# Patient Record
Sex: FEMALE | Race: WHITE | NOT HISPANIC OR LATINO | Employment: OTHER | RURAL
[De-identification: names, ages, dates, MRNs, and addresses within clinical notes are randomized per-mention and may not be internally consistent; named-entity substitution may affect disease eponyms.]

---

## 2020-09-16 ENCOUNTER — HISTORICAL (OUTPATIENT)
Dept: ADMINISTRATIVE | Facility: HOSPITAL | Age: 75
End: 2020-09-16

## 2021-01-29 ENCOUNTER — HISTORICAL (OUTPATIENT)
Dept: ADMINISTRATIVE | Facility: HOSPITAL | Age: 76
End: 2021-01-29

## 2022-08-24 ENCOUNTER — OFFICE VISIT (OUTPATIENT)
Dept: GASTROENTEROLOGY | Facility: CLINIC | Age: 77
End: 2022-08-24
Payer: MEDICARE

## 2022-08-24 VITALS
BODY MASS INDEX: 21.64 KG/M2 | HEART RATE: 60 BPM | OXYGEN SATURATION: 97 % | WEIGHT: 114.63 LBS | SYSTOLIC BLOOD PRESSURE: 164 MMHG | HEIGHT: 61 IN | DIASTOLIC BLOOD PRESSURE: 50 MMHG

## 2022-08-24 DIAGNOSIS — R68.81 EARLY SATIETY: ICD-10-CM

## 2022-08-24 DIAGNOSIS — R14.0 BLOATING: Primary | ICD-10-CM

## 2022-08-24 DIAGNOSIS — K59.00 CONSTIPATION, UNSPECIFIED CONSTIPATION TYPE: ICD-10-CM

## 2022-08-24 PROCEDURE — 99214 PR OFFICE/OUTPT VISIT, EST, LEVL IV, 30-39 MIN: ICD-10-PCS | Mod: ,,, | Performed by: NURSE PRACTITIONER

## 2022-08-24 PROCEDURE — 1101F PT FALLS ASSESS-DOCD LE1/YR: CPT | Mod: CPTII,,, | Performed by: NURSE PRACTITIONER

## 2022-08-24 PROCEDURE — 3288F FALL RISK ASSESSMENT DOCD: CPT | Mod: CPTII,,, | Performed by: NURSE PRACTITIONER

## 2022-08-24 PROCEDURE — 99214 OFFICE O/P EST MOD 30 MIN: CPT | Mod: ,,, | Performed by: NURSE PRACTITIONER

## 2022-08-24 PROCEDURE — 3288F PR FALLS RISK ASSESSMENT DOCUMENTED: ICD-10-PCS | Mod: CPTII,,, | Performed by: NURSE PRACTITIONER

## 2022-08-24 PROCEDURE — 1160F PR REVIEW ALL MEDS BY PRESCRIBER/CLIN PHARMACIST DOCUMENTED: ICD-10-PCS | Mod: CPTII,,, | Performed by: NURSE PRACTITIONER

## 2022-08-24 PROCEDURE — 1159F PR MEDICATION LIST DOCUMENTED IN MEDICAL RECORD: ICD-10-PCS | Mod: CPTII,,, | Performed by: NURSE PRACTITIONER

## 2022-08-24 PROCEDURE — 1159F MED LIST DOCD IN RCRD: CPT | Mod: CPTII,,, | Performed by: NURSE PRACTITIONER

## 2022-08-24 PROCEDURE — 3077F PR MOST RECENT SYSTOLIC BLOOD PRESSURE >= 140 MM HG: ICD-10-PCS | Mod: CPTII,,, | Performed by: NURSE PRACTITIONER

## 2022-08-24 PROCEDURE — 3078F DIAST BP <80 MM HG: CPT | Mod: CPTII,,, | Performed by: NURSE PRACTITIONER

## 2022-08-24 PROCEDURE — 3077F SYST BP >= 140 MM HG: CPT | Mod: CPTII,,, | Performed by: NURSE PRACTITIONER

## 2022-08-24 PROCEDURE — 1160F RVW MEDS BY RX/DR IN RCRD: CPT | Mod: CPTII,,, | Performed by: NURSE PRACTITIONER

## 2022-08-24 PROCEDURE — 1101F PR PT FALLS ASSESS DOC 0-1 FALLS W/OUT INJ PAST YR: ICD-10-PCS | Mod: CPTII,,, | Performed by: NURSE PRACTITIONER

## 2022-08-24 PROCEDURE — 3078F PR MOST RECENT DIASTOLIC BLOOD PRESSURE < 80 MM HG: ICD-10-PCS | Mod: CPTII,,, | Performed by: NURSE PRACTITIONER

## 2022-08-24 RX ORDER — POLYETHYLENE GLYCOL 3350 17 G/17G
17 POWDER, FOR SOLUTION ORAL 2 TIMES DAILY
COMMUNITY

## 2022-08-24 RX ORDER — FERROUS SULFATE, DRIED 160(50) MG
1 TABLET, EXTENDED RELEASE ORAL
COMMUNITY

## 2022-08-24 RX ORDER — DENOSUMAB 60 MG/ML
INJECTION SUBCUTANEOUS
COMMUNITY

## 2022-08-24 RX ORDER — ACYCLOVIR 800 MG/1
TABLET ORAL
COMMUNITY

## 2022-08-24 RX ORDER — DILTIAZEM HYDROCHLORIDE 240 MG/1
240 CAPSULE, COATED, EXTENDED RELEASE ORAL DAILY
COMMUNITY

## 2022-08-24 RX ORDER — LORAZEPAM 1 MG/1
1 TABLET ORAL
COMMUNITY

## 2022-08-24 NOTE — PROGRESS NOTES
Kim Aden is a 76 y.o. female here for No chief complaint on file.        PCP: Andre Dixon  Referring Provider: No referring provider defined for this encounter.     HPI:  Presents with report of bloating and abdominal discomfort. Reports that she does have decreased appetite. States that she is never hungry. Endorses early satiety. Does have epigastric discomfort that is intermittent. Is not taking PPI due to severe osteoporosis. States that bloating is worse at night. Chronic constipation. Is taking Miralax powder twice daily. Reports that since doing this bowel movements have improved and increased in quantity. Last colonoscopy 9/28/20, recommendation to repeat in 5 years. History of colon cancer in the patient's brother. Unable to tolerate increased fiber in her diet due to gas and bloating. Has not had an EGD in over 10 years.        ROS:  Review of Systems   Constitutional: Positive for appetite change (early satiety, decreased appetite). Negative for fatigue, fever and unexpected weight change.   HENT: Negative for trouble swallowing.    Cardiovascular: Negative for chest pain.   Gastrointestinal: Positive for abdominal distention, abdominal pain (epigastric pain) and constipation. Negative for blood in stool, change in bowel habit, diarrhea, nausea, vomiting, reflux and change in bowel habit.   Musculoskeletal: Positive for arthralgias. Negative for gait problem and joint swelling.   Integumentary:  Negative for pallor.   Neurological: Negative for light-headedness.   Psychiatric/Behavioral: The patient is not nervous/anxious.           PMHX:  has a past medical history of Digestive disorder and Hypertension.    PSHX:  has a past surgical history that includes Cholecystectomy; Tonsillectomy; and left shoulder.    PFHX: family history includes Diabetes in her brother; Hypertension in her brother.    PSlHX:  reports that she has never smoked. She has never used smokeless tobacco. She reports  "current alcohol use. She reports that she does not use drugs.        Review of patient's allergies indicates:   Allergen Reactions    Ciprofloxacin Rash    Penicillins Rash       Medication List with Changes/Refills   Current Medications    ACYCLOVIR (ZOVIRAX) 800 MG TAB    acyclovir 800 mg tablet   Take 1 tablet 3 times a day by oral route as needed.    CALCIUM-VITAMIN D3 (OS-SHAQUILLE 500 + D3) 500 MG-5 MCG (200 UNIT) PER TABLET    Take 1 tablet by mouth.    DENOSUMAB (PROLIA) 60 MG/ML SYRG    Prolia 60 mg/mL subcutaneous syringe    DILTIAZEM (CARDIZEM CD) 240 MG 24 HR CAPSULE    Take 240 mg by mouth once daily.    LACTOBACILLUS ACIDOPHILUS ORAL    Take 1 capsule by mouth once daily.    LORAZEPAM (ATIVAN) 1 MG TABLET    Take 1 mg by mouth before meals, at bedtime and at 0200.    MULTIVITAMIN CAPSULE    Take 1 capsule by mouth.    POLYETHYLENE GLYCOL (GLYCOLAX) 17 GRAM/DOSE POWDER    Take 17 g by mouth 2 (two) times a day.    RANITIDINE (ZANTAC) 150 MG TABLET    Take 150 mg by mouth as needed.    VIT B COMPLEX 100 NO.2/HERBS (VITAMIN B COMPLEX 100  2-HERBS ORAL)    vitamin B complex 100  2-herbs   Once Daily        Objective Findings:  Vital Signs:  BP (!) 164/50   Pulse 60   Ht 5' 1" (1.549 m)   Wt 52 kg (114 lb 9.6 oz)   SpO2 97%   BMI 21.65 kg/m²  Body mass index is 21.65 kg/m².    Physical Exam:  Physical Exam  Vitals and nursing note reviewed.   Constitutional:       General: She is not in acute distress.     Appearance: Normal appearance.   HENT:      Mouth/Throat:      Mouth: Mucous membranes are moist.   Cardiovascular:      Rate and Rhythm: Normal rate.   Pulmonary:      Breath sounds: No wheezing, rhonchi or rales.   Abdominal:      General: Bowel sounds are normal. There is no distension.      Palpations: Abdomen is soft. There is no mass.      Tenderness: There is no abdominal tenderness. There is no guarding or rebound.      Hernia: No hernia is present.   Musculoskeletal:      Right lower leg: No " edema.      Left lower leg: No edema.   Skin:     General: Skin is warm and dry.      Coloration: Skin is not jaundiced or pale.   Neurological:      Mental Status: She is alert and oriented to person, place, and time.   Psychiatric:         Mood and Affect: Mood normal.          Labs:  No results found for: WBC, HGB, HCT, MCV, RDW, PLT, GRAN, LYMPH, MONO, EOS, BASO  No results found for: NA, K, CL, CO2, GLU, BUN, CREATININE, CALCIUM, PROT, ALBUMIN, BILITOT, ALKPHOS, AST, ALT      Imaging: No results found.      Assessment:  Kim Aden is a 76 y.o. female here with:  1. Bloating    2. Early satiety    3. Constipation, unspecified constipation type          Recommendations:  1. EGD  2. GES  3. Do not lay down within 3 hours of eating.  Avoid spicy, greasy foods  Eat 6-8 small meals per day  Avoid raw fruits and vegetables  Small amount of protein and fiber at one time  4. Pepcid 20 mg daily  5. Increase water to 64 ounces daily      Follow up in about 6 weeks (around 10/5/2022).      Order summary:  Orders Placed This Encounter    NM Gastric Emptying    EGD       Thank you for allowing me to participate in the care of Kim Aden.      OVIDIO Lezama

## 2022-08-30 ENCOUNTER — TELEPHONE (OUTPATIENT)
Dept: GASTROENTEROLOGY | Facility: CLINIC | Age: 77
End: 2022-08-30
Payer: MEDICARE

## 2022-08-30 NOTE — TELEPHONE ENCOUNTER
Returned call to patient. Patient states she is okay with having the EGD, but she was concerned about having the gastric emptying study. Patient states was worried about having to sit there in between scans and asking how long it would take. Instructed patient on the procedure. Patient asking for specific time frames. Instructed patient to call the imaging center for those specific questions. Patient verbalized understanding.            ----- Message from Yue Bear sent at 8/30/2022 11:12 AM CDT -----  Questions she has about upcoming imaging tests asked for darcy

## 2022-09-19 ENCOUNTER — HOSPITAL ENCOUNTER (OUTPATIENT)
Dept: RADIOLOGY | Facility: HOSPITAL | Age: 77
Discharge: HOME | End: 2022-09-19
Attending: NURSE PRACTITIONER
Payer: MEDICARE

## 2022-09-19 DIAGNOSIS — R68.81 EARLY SATIETY: ICD-10-CM

## 2022-09-19 DIAGNOSIS — R14.0 BLOATING: ICD-10-CM

## 2022-09-19 PROCEDURE — A9541 TC99M SULFUR COLLOID: HCPCS

## 2022-09-19 PROCEDURE — 78264 NM GASTRIC EMPTYING: ICD-10-PCS | Mod: 26,,, | Performed by: RADIOLOGY

## 2022-09-19 PROCEDURE — 78264 GASTRIC EMPTYING IMG STUDY: CPT | Mod: 26,,, | Performed by: RADIOLOGY

## 2022-09-20 ENCOUNTER — ANESTHESIA (OUTPATIENT)
Dept: GASTROENTEROLOGY | Facility: HOSPITAL | Age: 77
End: 2022-09-20
Payer: MEDICARE

## 2022-09-20 ENCOUNTER — ANESTHESIA EVENT (OUTPATIENT)
Dept: GASTROENTEROLOGY | Facility: HOSPITAL | Age: 77
End: 2022-09-20
Payer: MEDICARE

## 2022-09-20 ENCOUNTER — HOSPITAL ENCOUNTER (OUTPATIENT)
Dept: GASTROENTEROLOGY | Facility: HOSPITAL | Age: 77
Discharge: HOME | End: 2022-09-20
Attending: NURSE PRACTITIONER
Payer: MEDICARE

## 2022-09-20 VITALS
HEART RATE: 54 BPM | RESPIRATION RATE: 15 BRPM | DIASTOLIC BLOOD PRESSURE: 46 MMHG | OXYGEN SATURATION: 99 % | TEMPERATURE: 98 F | SYSTOLIC BLOOD PRESSURE: 106 MMHG

## 2022-09-20 DIAGNOSIS — K29.00 ACUTE SUPERFICIAL GASTRITIS WITHOUT HEMORRHAGE: ICD-10-CM

## 2022-09-20 DIAGNOSIS — R68.81 EARLY SATIETY: ICD-10-CM

## 2022-09-20 DIAGNOSIS — R10.9 ABDOMINAL PAIN, UNSPECIFIED ABDOMINAL LOCATION: Primary | ICD-10-CM

## 2022-09-20 DIAGNOSIS — R14.0 BLOATING: ICD-10-CM

## 2022-09-20 DIAGNOSIS — K44.9 HH (HIATUS HERNIA): ICD-10-CM

## 2022-09-20 PROCEDURE — 25000003 PHARM REV CODE 250: Performed by: NURSE ANESTHETIST, CERTIFIED REGISTERED

## 2022-09-20 PROCEDURE — 43239 EGD BIOPSY SINGLE/MULTIPLE: CPT

## 2022-09-20 PROCEDURE — 63600175 PHARM REV CODE 636 W HCPCS: Performed by: NURSE ANESTHETIST, CERTIFIED REGISTERED

## 2022-09-20 PROCEDURE — 37000008 HC ANESTHESIA 1ST 15 MINUTES

## 2022-09-20 PROCEDURE — D9220A PRA ANESTHESIA: Mod: ,,, | Performed by: NURSE ANESTHETIST, CERTIFIED REGISTERED

## 2022-09-20 PROCEDURE — D9220A PRA ANESTHESIA: ICD-10-PCS | Mod: ,,, | Performed by: NURSE ANESTHETIST, CERTIFIED REGISTERED

## 2022-09-20 PROCEDURE — 27201423 OPTIME MED/SURG SUP & DEVICES STERILE SUPPLY

## 2022-09-20 RX ORDER — FAMOTIDINE 40 MG/1
40 TABLET, FILM COATED ORAL DAILY
Qty: 30 TABLET | Refills: 11 | Status: SHIPPED | OUTPATIENT
Start: 2022-09-20 | End: 2023-09-20

## 2022-09-20 RX ORDER — SODIUM CHLORIDE 0.9 % (FLUSH) 0.9 %
10 SYRINGE (ML) INJECTION
Status: CANCELLED | OUTPATIENT
Start: 2022-09-20

## 2022-09-20 RX ORDER — PROPOFOL 10 MG/ML
VIAL (ML) INTRAVENOUS
Status: DISCONTINUED | OUTPATIENT
Start: 2022-09-20 | End: 2022-09-20

## 2022-09-20 RX ORDER — LIDOCAINE HYDROCHLORIDE 20 MG/ML
INJECTION, SOLUTION EPIDURAL; INFILTRATION; INTRACAUDAL; PERINEURAL
Status: DISCONTINUED | OUTPATIENT
Start: 2022-09-20 | End: 2022-09-20

## 2022-09-20 RX ADMIN — PROPOFOL 30 MG: 10 INJECTION, EMULSION INTRAVENOUS at 10:09

## 2022-09-20 RX ADMIN — LIDOCAINE HYDROCHLORIDE 60 MG: 20 INJECTION, SOLUTION INTRAVENOUS at 10:09

## 2022-09-20 RX ADMIN — SODIUM CHLORIDE: 9 INJECTION, SOLUTION INTRAVENOUS at 10:09

## 2022-09-20 RX ADMIN — PROPOFOL 20 MG: 10 INJECTION, EMULSION INTRAVENOUS at 10:09

## 2022-09-20 RX ADMIN — PROPOFOL 60 MG: 10 INJECTION, EMULSION INTRAVENOUS at 10:09

## 2022-09-20 NOTE — DISCHARGE INSTRUCTIONS
Procedure Date  9/20/22     Impression  Overall Impression: Mucosa of the esophagus is normal with z-line at 37cm, overlying a 3cm hiatus hernia. The stomach has gastric erythema without ulcers, biopsies were obtained. The pyloric channel is patent. Mucosa of the duodenum is normal.     Recommendation    Await pathology results      Avoid nsaids; Pepcid 40mg q AM to Bid as needed. Offer patient a small bowel x-ray series for mid abdominal pain, then return to GI clinic.       DO NOT DRIVE FOR 24 HOURS OR OPERATE HEAVY MACHINERY.   DO NOT SIGN LEGAL DOCUMENTS.  DRINK PLENTY OF FLUIDS. RESUME DIET.

## 2022-09-20 NOTE — H&P
[facility] - Endoscopy  Gastroenterology  H&P    Patient Name: Kim Aden  MRN: [id number]  Admission Date: 9/20/2022  Code Status: No Order    Attending Provider: VI Flores   Primary Care Physician: Andre Dixon MD  Principal Problem:<principal problem not specified>    Subjective:     History of Present Illness: Pt c/o abdominal bloating; for egd.    Past Medical History:   Diagnosis Date    Digestive disorder     Hypertension        Past Surgical History:   Procedure Laterality Date    CHOLECYSTECTOMY      left shoulder      TONSILLECTOMY         Review of patient's allergies indicates:   Allergen Reactions    Ciprofloxacin Rash    Penicillins Rash     Family History       Problem Relation (Age of Onset)    Diabetes Brother    Hypertension Brother          Tobacco Use    Smoking status: Never    Smokeless tobacco: Never   Substance and Sexual Activity    Alcohol use: Yes     Comment: twice weekly    Drug use: Never    Sexual activity: Not on file     Review of Systems   Respiratory: Negative.     Cardiovascular: Negative.    Gastrointestinal:  Positive for abdominal distention and constipation.   Objective:     Vital Signs (Most Recent):  Temp: 97.9 °F (36.6 °C) (09/20/22 1012)  Pulse: (!) 53 (09/20/22 1012)  Resp: 14 (09/20/22 1012)  BP: (!) 159/79 (09/20/22 1012)  SpO2: 98 % (09/20/22 1012)   Vital Signs (24h Range):  Temp:  [97.9 °F (36.6 °C)] 97.9 °F (36.6 °C)  Pulse:  [53] 53  Resp:  [14] 14  SpO2:  [98 %] 98 %  BP: (159)/(79) 159/79        There is no height or weight on file to calculate BMI.    No intake or output data in the 24 hours ending 09/20/22 1022    Lines/Drains/Airways       Peripheral Intravenous Line  Duration                  Peripheral IV - Single Lumen 09/20/22 1013 22 G Anterior;Left Hand <1 day                    Physical Exam  Vitals reviewed.   Constitutional:       General: She is not in acute distress.     Appearance: Normal appearance. She is well-developed. She is  not ill-appearing.   HENT:      Head: Normocephalic and atraumatic.      Nose: Nose normal.   Eyes:      Pupils: Pupils are equal, round, and reactive to light.   Cardiovascular:      Rate and Rhythm: Normal rate and regular rhythm.   Pulmonary:      Effort: Pulmonary effort is normal.      Breath sounds: Normal breath sounds. No wheezing.   Abdominal:      General: Abdomen is flat. Bowel sounds are normal. There is no distension.      Palpations: Abdomen is soft.      Tenderness: There is no abdominal tenderness. There is no guarding.   Skin:     General: Skin is warm and dry.      Coloration: Skin is not jaundiced.   Neurological:      Mental Status: She is alert.   Psychiatric:         Attention and Perception: Attention normal.         Mood and Affect: Affect normal.         Speech: Speech normal.         Behavior: Behavior is cooperative.      Comments: Pt was calm while speaking.       Significant Labs:  CBC: No results for input(s): WBC, HGB, HCT, PLT in the last 48 hours.  CMP: No results for input(s): GLU, CALCIUM, ALBUMIN, PROT, NA, K, CO2, CL, BUN, CREATININE, ALKPHOS, ALT, AST, BILITOT in the last 48 hours.    Significant Imaging:  Imaging results within the past 24 hours have been reviewed.    Assessment/Plan:     There are no hospital problems to display for this patient.        Imp: abdominal bloating and mid abdominal pain  Plan: egd    Chuy Skaggs MD  Gastroenterology  [facility] - Endoscopy

## 2022-09-20 NOTE — ANESTHESIA POSTPROCEDURE EVALUATION
Anesthesia Post Evaluation    Patient: Kim Mccarthy Post    Procedure(s) Performed: EGD with Biopsy    Final Anesthesia Type: general      Patient location during evaluation: GI PACU  Patient participation: Yes- Able to Participate  Level of consciousness: awake and alert  Post-procedure vital signs: reviewed and stable  Pain management: adequate  Airway patency: patent  SARA mitigation strategies: Multimodal analgesia  PONV status at discharge: No PONV  Anesthetic complications: no      Cardiovascular status: stable  Respiratory status: unassisted  Hydration status: euvolemic  Follow-up not needed.          Vitals Value Taken Time   /79 09/20/22 1054   Temp 36.5 °C (97.7 °F) 09/20/22 1028   Pulse 51 09/20/22 1054   Resp 13 09/20/22 1054   SpO2 99 % 09/20/22 1054   Vitals shown include unvalidated device data.      No case tracking events are documented in the log.      Pain/Mony Score: No data recorded

## 2022-09-20 NOTE — TRANSFER OF CARE
Anesthesia Transfer of Care Note    Patient: Kim Mccarthy Post    Procedure(s) Performed: EGD with biopsy    Patient location: GI    Anesthesia Type: general    Transport from OR: Transported from OR on room air with adequate spontaneous ventilation    Post pain: adequate analgesia    Post assessment: no apparent anesthetic complications and tolerated procedure well    Post vital signs: stable    Level of consciousness: responds to stimulation and sedated    Nausea/Vomiting: no nausea/vomiting    Complications: none    Transfer of care protocol was followed      Last vitals:   Visit Vitals  BP (!) 125/40 (BP Location: Right arm, Patient Position: Lying)   Pulse 60   Temp 36.5 °C (97.7 °F) (Skin)   Resp 15   SpO2 98%   Breastfeeding No

## 2022-09-20 NOTE — ANESTHESIA PREPROCEDURE EVALUATION
09/20/2022  Kim Aden is a 76 y.o., female.    Past Medical History:   Diagnosis Date    Digestive disorder     Hypertension        Past Surgical History:   Procedure Laterality Date    CHOLECYSTECTOMY      left shoulder      TONSILLECTOMY         Family History   Problem Relation Age of Onset    Diabetes Brother     Hypertension Brother        Social History     Socioeconomic History    Marital status:    Tobacco Use    Smoking status: Never    Smokeless tobacco: Never   Substance and Sexual Activity    Alcohol use: Yes     Comment: twice weekly    Drug use: Never       Current Outpatient Medications   Medication Sig Dispense Refill    acyclovir (ZOVIRAX) 800 MG Tab acyclovir 800 mg tablet   Take 1 tablet 3 times a day by oral route as needed.      calcium-vitamin D3 (OS-SHAQUILLE 500 + D3) 500 mg-5 mcg (200 unit) per tablet Take 1 tablet by mouth.      denosumab (PROLIA) 60 mg/mL Syrg Prolia 60 mg/mL subcutaneous syringe      diltiaZEM (CARDIZEM CD) 240 MG 24 hr capsule Take 240 mg by mouth once daily.      LACTOBACILLUS ACIDOPHILUS ORAL Take 1 capsule by mouth once daily.      LORazepam (ATIVAN) 1 MG tablet Take 1 mg by mouth before meals, at bedtime and at 0200.      multivitamin capsule Take 1 capsule by mouth.      polyethylene glycol (GLYCOLAX) 17 gram/dose powder Take 17 g by mouth 2 (two) times a day.      ranitidine (ZANTAC) 150 MG tablet Take 150 mg by mouth as needed.      vit B complex 100 no.2/herbs (VITAMIN B COMPLEX 100  2-HERBS ORAL) vitamin B complex 100  2-herbs   Once Daily       No current facility-administered medications for this encounter.       Review of patient's allergies indicates:   Allergen Reactions    Ciprofloxacin Rash    Penicillins Rash       Pre-op Assessment    I have reviewed the Patient Summary Reports.    I have reviewed the NPO Status.    I have reviewed the Medications.     Review of Systems  Anesthesia Hx:  No problems with previous Anesthesia    Cardiovascular:   Hypertension        Physical Exam    Airway:  Mallampati: II   Mouth Opening: Normal  TM Distance: Normal  Tongue: Normal        Anesthesia Plan  Type of Anesthesia, risks & benefits discussed:    Anesthesia Type: Gen Natural Airway  Intra-op Monitoring Plan: Standard ASA Monitors  Post Op Pain Control Plan: multimodal analgesia  Induction:  IV  Informed Consent: Informed consent signed with the Patient and all parties understand the risks and agree with anesthesia plan.  All questions answered. Patient consented to blood products? Yes  ASA Score: 2  Day of Surgery Review of History & Physical: H&P Update referred to the surgeon/provider.    Ready For Surgery From Anesthesia Perspective.     .

## 2022-09-21 LAB
ESTROGEN SERPL-MCNC: NORMAL PG/ML
INSULIN SERPL-ACNC: NORMAL U[IU]/ML
LAB AP GROSS DESCRIPTION: NORMAL
LAB AP LABORATORY NOTES: NORMAL
T3RU NFR SERPL: NORMAL %

## 2022-09-22 ENCOUNTER — TELEPHONE (OUTPATIENT)
Dept: GASTROENTEROLOGY | Facility: CLINIC | Age: 77
End: 2022-09-22
Payer: MEDICARE

## 2022-09-22 NOTE — TELEPHONE ENCOUNTER
Your EGD bx shows gastrits(inflammation in the stomach lining) and does not have a bacterial infection. Dr. Skaggs recommends you to continue your PPI and if you have any questions please call our office.    ----- Message from Chuy Skaggs MD sent at 9/21/2022  4:37 PM CDT -----  EGD bx shows gastritis without H.pylori.  ----- Message -----  From: Background User Lab  Sent: 9/21/2022   2:08 PM CDT  To: Chuy Skaggs MD

## 2022-09-26 ENCOUNTER — HOSPITAL ENCOUNTER (OUTPATIENT)
Dept: RADIOLOGY | Facility: HOSPITAL | Age: 77
Discharge: HOME | End: 2022-09-26
Attending: INTERNAL MEDICINE
Payer: MEDICARE

## 2022-09-26 DIAGNOSIS — R10.9 ABDOMINAL PAIN, UNSPECIFIED ABDOMINAL LOCATION: ICD-10-CM

## 2022-09-26 PROCEDURE — 74250 X-RAY XM SM INT 1CNTRST STD: CPT | Mod: TC

## 2022-09-26 PROCEDURE — 74250 FL SMALL BOWEL FOLLOW THROUGH: ICD-10-PCS | Mod: 26,,, | Performed by: RADIOLOGY

## 2022-09-26 PROCEDURE — 74250 X-RAY XM SM INT 1CNTRST STD: CPT | Mod: 26,,, | Performed by: RADIOLOGY

## 2022-09-27 ENCOUNTER — TELEPHONE (OUTPATIENT)
Dept: GASTROENTEROLOGY | Facility: CLINIC | Age: 77
End: 2022-09-27
Payer: MEDICARE

## 2022-09-27 NOTE — TELEPHONE ENCOUNTER
Called patient to discuss results and verbalized understanding.      ----- Message from Chuy Skaggs MD sent at 9/26/2022  3:15 PM CDT -----  Small bowel x-ray series shows normal appearing small intestine, increased stool in the colon and osteopenia.  ----- Message -----  From: Kamla, Rad Results In  Sent: 9/26/2022  12:20 PM CDT  To: Chuy Skaggs MD

## 2022-10-04 PROCEDURE — 82653 MAYO GENERIC ORDERABLE: ICD-10-PCS | Mod: 90,,, | Performed by: CLINICAL MEDICAL LABORATORY

## 2022-10-04 PROCEDURE — 82653 EL-1 FECAL QUANTITATIVE: CPT | Mod: 90,,, | Performed by: CLINICAL MEDICAL LABORATORY

## 2022-10-05 ENCOUNTER — OFFICE VISIT (OUTPATIENT)
Dept: GASTROENTEROLOGY | Facility: CLINIC | Age: 77
End: 2022-10-05
Payer: MEDICARE

## 2022-10-05 ENCOUNTER — TELEPHONE (OUTPATIENT)
Dept: GASTROENTEROLOGY | Facility: CLINIC | Age: 77
End: 2022-10-05
Payer: MEDICARE

## 2022-10-05 ENCOUNTER — HOSPITAL ENCOUNTER (OUTPATIENT)
Dept: RADIOLOGY | Facility: HOSPITAL | Age: 77
Discharge: HOME | End: 2022-10-05
Attending: NURSE PRACTITIONER
Payer: MEDICARE

## 2022-10-05 VITALS
DIASTOLIC BLOOD PRESSURE: 67 MMHG | HEIGHT: 61 IN | WEIGHT: 111.81 LBS | HEART RATE: 55 BPM | OXYGEN SATURATION: 98 % | SYSTOLIC BLOOD PRESSURE: 176 MMHG | BODY MASS INDEX: 21.11 KG/M2

## 2022-10-05 DIAGNOSIS — R10.9 ABDOMINAL PAIN, UNSPECIFIED ABDOMINAL LOCATION: ICD-10-CM

## 2022-10-05 DIAGNOSIS — K44.9 HH (HIATUS HERNIA): ICD-10-CM

## 2022-10-05 DIAGNOSIS — R14.0 BLOATING: ICD-10-CM

## 2022-10-05 DIAGNOSIS — R68.81 EARLY SATIETY: ICD-10-CM

## 2022-10-05 DIAGNOSIS — R10.9 ABDOMINAL PAIN, UNSPECIFIED ABDOMINAL LOCATION: Primary | ICD-10-CM

## 2022-10-05 DIAGNOSIS — K59.00 CONSTIPATION, UNSPECIFIED CONSTIPATION TYPE: ICD-10-CM

## 2022-10-05 DIAGNOSIS — K29.00 ACUTE SUPERFICIAL GASTRITIS WITHOUT HEMORRHAGE: ICD-10-CM

## 2022-10-05 PROCEDURE — 1160F PR REVIEW ALL MEDS BY PRESCRIBER/CLIN PHARMACIST DOCUMENTED: ICD-10-PCS | Mod: CPTII,,, | Performed by: NURSE PRACTITIONER

## 2022-10-05 PROCEDURE — 3077F PR MOST RECENT SYSTOLIC BLOOD PRESSURE >= 140 MM HG: ICD-10-PCS | Mod: CPTII,,, | Performed by: NURSE PRACTITIONER

## 2022-10-05 PROCEDURE — 1159F MED LIST DOCD IN RCRD: CPT | Mod: CPTII,,, | Performed by: NURSE PRACTITIONER

## 2022-10-05 PROCEDURE — 3078F PR MOST RECENT DIASTOLIC BLOOD PRESSURE < 80 MM HG: ICD-10-PCS | Mod: CPTII,,, | Performed by: NURSE PRACTITIONER

## 2022-10-05 PROCEDURE — 1159F PR MEDICATION LIST DOCUMENTED IN MEDICAL RECORD: ICD-10-PCS | Mod: CPTII,,, | Performed by: NURSE PRACTITIONER

## 2022-10-05 PROCEDURE — 99214 PR OFFICE/OUTPT VISIT, EST, LEVL IV, 30-39 MIN: ICD-10-PCS | Mod: ,,, | Performed by: NURSE PRACTITIONER

## 2022-10-05 PROCEDURE — 74018 RADEX ABDOMEN 1 VIEW: CPT | Mod: TC

## 2022-10-05 PROCEDURE — 3078F DIAST BP <80 MM HG: CPT | Mod: CPTII,,, | Performed by: NURSE PRACTITIONER

## 2022-10-05 PROCEDURE — 99214 OFFICE O/P EST MOD 30 MIN: CPT | Mod: ,,, | Performed by: NURSE PRACTITIONER

## 2022-10-05 PROCEDURE — 74018 XR KUB: ICD-10-PCS | Mod: 26,,, | Performed by: RADIOLOGY

## 2022-10-05 PROCEDURE — 3288F PR FALLS RISK ASSESSMENT DOCUMENTED: ICD-10-PCS | Mod: CPTII,,, | Performed by: NURSE PRACTITIONER

## 2022-10-05 PROCEDURE — 1160F RVW MEDS BY RX/DR IN RCRD: CPT | Mod: CPTII,,, | Performed by: NURSE PRACTITIONER

## 2022-10-05 PROCEDURE — 1101F PT FALLS ASSESS-DOCD LE1/YR: CPT | Mod: CPTII,,, | Performed by: NURSE PRACTITIONER

## 2022-10-05 PROCEDURE — 1101F PR PT FALLS ASSESS DOC 0-1 FALLS W/OUT INJ PAST YR: ICD-10-PCS | Mod: CPTII,,, | Performed by: NURSE PRACTITIONER

## 2022-10-05 PROCEDURE — 3077F SYST BP >= 140 MM HG: CPT | Mod: CPTII,,, | Performed by: NURSE PRACTITIONER

## 2022-10-05 PROCEDURE — 74018 RADEX ABDOMEN 1 VIEW: CPT | Mod: 26,,, | Performed by: RADIOLOGY

## 2022-10-05 PROCEDURE — 3288F FALL RISK ASSESSMENT DOCD: CPT | Mod: CPTII,,, | Performed by: NURSE PRACTITIONER

## 2022-10-05 NOTE — TELEPHONE ENCOUNTER
Results and recommendations called to patient. Verbalized understanding.          ----- Message from VI Flores sent at 10/5/2022  3:31 PM CDT -----  Large amount of stool in the colon. Very constipated which increases abdominal discomfort. She needs to take a laxative to clean out. Needs Miralax 17 grams twice daily and MOM on 3rd day if no BM.No fat was seen in the stool. Some stool tests are pending

## 2022-10-05 NOTE — PROGRESS NOTES
Kim Aden is a 76 y.o. female here for Follow-up        PCP: Andre Dixon  Referring Provider: No referring provider defined for this encounter.     HPI:  Presents for follow up after EGD. EGD 9/20/22, 3 cm hernia. GES normal. SB series did show constipation, without other abnormalities. Reports that everything she eats increases the abdominal pain.Continues to report constipation. Is taking Miralax powder for constipation. States that she has been reading about pancreatic insufficiency and SBO and would like to have stool testing. She does not have diarrhea but feels that she would still like to be tested. Last colonoscopy 9/28/20, recommendation to repeat in 5 years. History of colon cancer in the patient's brother.     Follow-up  Associated symptoms include abdominal pain. Pertinent negatives include no change in bowel habit, chest pain, fatigue, fever, nausea or vomiting.       ROS:  Review of Systems   Constitutional:  Negative for appetite change, fatigue, fever and unexpected weight change.   HENT:  Negative for trouble swallowing.    Respiratory:  Negative for shortness of breath.    Cardiovascular:  Negative for chest pain.   Gastrointestinal:  Positive for abdominal pain and constipation. Negative for blood in stool, change in bowel habit, diarrhea, nausea, vomiting, reflux and change in bowel habit.   Musculoskeletal:  Negative for gait problem.   Integumentary:  Negative for pallor.   Neurological:  Negative for dizziness.   Psychiatric/Behavioral:  The patient is not nervous/anxious.         PMHX:  has a past medical history of Acute superficial gastritis without hemorrhage (9/20/2022), Digestive disorder, HH (hiatus hernia) (9/20/2022), and Hypertension.    PSHX:  has a past surgical history that includes Cholecystectomy; Tonsillectomy; and left shoulder.    PFHX: family history includes Diabetes in her brother; Hypertension in her brother.    PSlHX:  reports that she has never smoked. She  "has never used smokeless tobacco. She reports current alcohol use. She reports that she does not use drugs.        Review of patient's allergies indicates:   Allergen Reactions    Ciprofloxacin Rash    Penicillins Rash       Medication List with Changes/Refills   Current Medications    ACYCLOVIR (ZOVIRAX) 800 MG TAB    acyclovir 800 mg tablet   Take 1 tablet 3 times a day by oral route as needed.    CALCIUM-VITAMIN D3 (OS-SHAQUILLE 500 + D3) 500 MG-5 MCG (200 UNIT) PER TABLET    Take 1 tablet by mouth.    DENOSUMAB (PROLIA) 60 MG/ML SYRG    Prolia 60 mg/mL subcutaneous syringe    DILTIAZEM (CARDIZEM CD) 240 MG 24 HR CAPSULE    Take 240 mg by mouth once daily.    FAMOTIDINE (PEPCID) 40 MG TABLET    Take 1 tablet (40 mg total) by mouth once daily.    LACTOBACILLUS ACIDOPHILUS ORAL    Take 1 capsule by mouth once daily.    LORAZEPAM (ATIVAN) 1 MG TABLET    Take 1 mg by mouth before meals, at bedtime and at 0200.    MULTIVITAMIN CAPSULE    Take 1 capsule by mouth.    POLYETHYLENE GLYCOL (GLYCOLAX) 17 GRAM/DOSE POWDER    Take 17 g by mouth 2 (two) times a day.    VIT B COMPLEX 100 NO.2/HERBS (VITAMIN B COMPLEX 100  2-HERBS ORAL)    vitamin B complex 100  2-herbs   Once Daily        Objective Findings:  Vital Signs:  BP (!) 176/67   Pulse (!) 55   Ht 5' 1" (1.549 m)   Wt 50.7 kg (111 lb 12.8 oz)   SpO2 98%   BMI 21.12 kg/m²  Body mass index is 21.12 kg/m².    Physical Exam:  Physical Exam  Vitals and nursing note reviewed.   Constitutional:       General: She is not in acute distress.     Appearance: Normal appearance.   HENT:      Mouth/Throat:      Mouth: Mucous membranes are moist.   Cardiovascular:      Rate and Rhythm: Normal rate.   Pulmonary:      Effort: Pulmonary effort is normal.      Breath sounds: No wheezing, rhonchi or rales.   Abdominal:      General: Abdomen is flat. Bowel sounds are normal. There is no distension.      Palpations: Abdomen is soft. There is no mass.      Tenderness: There is no abdominal " tenderness. There is no guarding or rebound.      Hernia: No hernia is present.   Skin:     General: Skin is warm and dry.      Coloration: Skin is not jaundiced or pale.   Neurological:      Mental Status: She is alert and oriented to person, place, and time.   Psychiatric:         Mood and Affect: Mood normal.        Labs:  No results found for: WBC, HGB, HCT, MCV, RDW, PLT, GRAN, LYMPH, MONO, EOS, BASO  No results found for: NA, K, CL, CO2, GLU, BUN, CREATININE, CALCIUM, PROT, ALBUMIN, BILITOT, ALKPHOS, AST, ALT      Imaging: NM Gastric Emptying    Result Date: 9/19/2022  EXAMINATION: Nuclear medicine GASTRIC EMPTYING CLINICAL HISTORY: .  Abdominal distension (gaseous) COMPARISON: No previous similar TECHNIQUE: Following the ingestion of 500 µCi technetium 99m sulfur colloid mixed with grits, gastric emptying study was performed using 90 minute protocol. Images were obtained over the abdomen and pelvis to document progression of radiotracer into small bowel. Gastric emptying curve was also generated. FINDINGS: The stomach is 38 % empty at 45 minutes and 60 70 % empty at 90 minutes. The half-time to empty measures 63 minutes. On the images, there is gradual migration of radiotracer from stomach into small bowel.     Normal gastric emptying with half time of 63 minutes Electronically signed by: Andre Bettencourt Date:    09/19/2022 Time:    10:32    FL Small Bowel Follow Through    Result Date: 9/26/2022  EXAMINATION: FL SMALL BOWEL FOLLOW THROUGH CLINICAL HISTORY: .  Unspecified abdominal pain COMPARISON: No previous similar TECHNIQUE:  film was 1st performed.  Following the ingestion of barium, films were then obtained over the abdomen and pelvis at various intervals until contrast material had reached the right colon.  Fluoroscopic evaluation of small bowel was then performed.  10 total images were archived.  Fluoroscopy time is 59 seconds.  The exam was performed by ZOË Cortez under the supervision of this  radiologist. FINDINGS:  film shows the bowel gas pattern to be nonobstructive.  There is moderate retained stool in the colon.  Pessary overlies the lower pelvis.  Osteopenia.  There is mild to moderate degenerative disc narrowing in the lower lumbar spine. Barium passes through the distensible small bowel and reaches the colon between 1 and 2 hours.  Small bowel mucosal fold pattern is normal.  There is no obvious small bowel mass or stricture.  Fluoroscopic evaluation of the terminal ileum and ileocecal valve region is normal.     No small bowel abnormality identified as detailed above Electronically signed by: Andre Bettencourt Date:    09/26/2022 Time:    12:18    EGD    Result Date: 9/20/2022  Table formatting from the original result was not included. Procedure Date 9/20/22 Impression Overall      Mucosa of the esophagus is normal with z-line at 37cm, overlying a 3cm hiatus hernia. The stomach has gastric erythema without ulcers, biopsies were obtained. The pyloric channel is patent. Mucosa of the duodenum is normal. Recommendation  Await pathology results Avoid nsaids; Pepcid 40mg q AM to Bid as needed. Offer patient a small bowel x-ray series for mid abdominal pain, then return to GI clinic. Indication Early satiety, Bloating Providers Maura Tripathi Technician Stephany Castro, RN Registered Nurse JAXSON Finnegan CRNA, MD Proceduralist Medications Moderate sedation administered by anesthesia staff - See anesthesia record. Preprocedure A history and physical has been performed, and patient medication allergies have been reviewed. The patient's tolerance of previous anesthesia has been reviewed. The risks and benefits of the procedure and the sedation options and risks were discussed with the patient. All questions were answered and informed consent obtained. ASA Score: ASA 2 - Patient with mild systemic disease with no functional limitations Mallampati Airway Score: II  (hard and soft palate, upper portion of tonsils anduvula visible) Details of the Procedure The patient's blood pressure, heart rate, level of consciousness, oxygen, respirations, ECG and ETCO2 were monitored throughout the procedure. The scope was introduced through the mouth and advanced to the third part of the duodenum. Retroflexion was performed in the cardia. Prior to the procedure, the patient's H. Pylori status was unknown. The patient's estimated blood loss was minimal (<5 mL). The procedure was not difficult. The patient tolerated the procedure well. There were no apparent complications. Scope: Gastroscope Scope Serial: [id number] Events Procedure Events Event Event Time Procedure Events Event Event Time SCOPE IN 9/20/2022 10:24 AM SCOPE OUT 9/20/2022 10:27 AM Findings Performed multiple forceps biopsies in the stomach 3 cm hiatal hernia Erythematous mucosa in the fundus of the stomach and antrum; performed cold forceps biopsy         Assessment:  Kim Aden is a 76 y.o. female here with:  1. Abdominal pain, unspecified abdominal location    2. Bloating    3. Constipation, unspecified constipation type    4. HH (hiatus hernia)    5. Early satiety    6. Acute superficial gastritis without hemorrhage          Recommendations:  1. KUB today  2. Celiac panel  3. Stool studies below  4. Continue Miralax     Follow up in about 3 months (around 1/5/2023).      Order summary:  Orders Placed This Encounter    Fecal leukocytes    X-Ray KUB    IgA    Endomysial antibody, IgA titer    Tissue Transglutaminase Ab, IgA    Fecal fat, qualitative    Calprotectin, Stool    Misc Sendout Test, Non-Blood Pancreatic elastase ELASF       Thank you for allowing me to participate in the care of Kim Aden.      OVIDIO Lezama

## 2022-10-08 LAB — MAYO GENERIC ORDERABLE RESULT: NORMAL

## 2022-10-12 ENCOUNTER — TELEPHONE (OUTPATIENT)
Dept: GASTROENTEROLOGY | Facility: CLINIC | Age: 77
End: 2022-10-12
Payer: MEDICARE

## 2022-10-12 NOTE — TELEPHONE ENCOUNTER
Returned call to patient. Patient wanting clarification on the recommendations given previously about taking miralax and milk of magnesia. Informed patient that Seble Agustin NP recommended taking miralax twice daily and milk of magnesia every third day if needed. Patient verbalized understanding.      ----- Message from Yue Bear sent at 10/12/2022 11:18 AM CDT -----  Left message she needs to talk to someone about all her tests and what she's supposed to do now...